# Patient Record
Sex: FEMALE | Race: WHITE
[De-identification: names, ages, dates, MRNs, and addresses within clinical notes are randomized per-mention and may not be internally consistent; named-entity substitution may affect disease eponyms.]

---

## 2018-11-14 ENCOUNTER — HOSPITAL ENCOUNTER (OUTPATIENT)
Dept: HOSPITAL 92 - BICCT | Age: 83
Discharge: HOME | End: 2018-11-14
Attending: INTERNAL MEDICINE
Payer: MEDICARE

## 2018-11-14 DIAGNOSIS — R91.8: ICD-10-CM

## 2018-11-14 DIAGNOSIS — C85.93: Primary | ICD-10-CM

## 2018-11-14 DIAGNOSIS — J44.9: ICD-10-CM

## 2018-11-14 DIAGNOSIS — I70.90: ICD-10-CM

## 2018-11-14 LAB — ESTIMATED GFR-MDRD - POC: 79

## 2018-11-14 PROCEDURE — 82565 ASSAY OF CREATININE: CPT

## 2018-11-14 PROCEDURE — 71260 CT THORAX DX C+: CPT

## 2018-11-14 PROCEDURE — 74177 CT ABD & PELVIS W/CONTRAST: CPT

## 2018-11-14 NOTE — CT
CT CHEST WITH IV CONTRAST:

CT ABDOMEN AND PELVIS WITH IV CONTRAST:

 

HISTORY:

Lymphoma.  Restaging.

 

FINDINGS:

The lungs are hyperinflated with scattered areas of parenchymal scarring.  At the anterior aspect of 
the right upper lobe, a 0.3 cm, noncalcified nodule is present.  At the superior segment of the left 
lower lobe, a 0.3 cm nodule is associated with an area of scarring.  No pleural fluid or pneumothorax
.  Nonenlarged lymph nodes are scattered about the mediastinum.  Calcification in the arterial struct
ures.  Bovine origin of the great vessels at the aortic arch.  Degenerative changes throughout the th
oracolumbar spine.  Small hiatal hernia.  Exophytic cyst projecting anteriorly from the right kidney 
measures up to 4.2 cm.  Small, nonspecific, low density nodule within the left thyroid lobe.  On the 
axial images, at the level of the renal veins, a low density area just to the left of the inferior ve
na cava appears that it could represent an aortocaval lymph node.  Coronal images show that this is a
 part of the venous system, connected to the inferior vena cava.  The urinary bladder is decompressed
.  No bowel abnormalities are evident.

 

IMPRESSION:

1.  No reliable evidence of recurrent lymphoma.

 

2.  Tiny bilateral lung nodules are as detailed above.

 

3.  Chronic obstructive pulmonary disease.

 

4.  Atherosclerosis.

 

5.  Small hiatal hernia.

 

POS: [facility]

## 2019-03-04 ENCOUNTER — HOSPITAL ENCOUNTER (EMERGENCY)
Dept: HOSPITAL 92 - SCSER | Age: 84
Discharge: HOME | End: 2019-03-04
Payer: MEDICARE

## 2019-03-04 DIAGNOSIS — F32.9: ICD-10-CM

## 2019-03-04 DIAGNOSIS — M85.88: ICD-10-CM

## 2019-03-04 DIAGNOSIS — Z79.51: ICD-10-CM

## 2019-03-04 DIAGNOSIS — Z79.899: ICD-10-CM

## 2019-03-04 DIAGNOSIS — M51.36: Primary | ICD-10-CM

## 2019-03-04 DIAGNOSIS — F41.9: ICD-10-CM

## 2019-03-04 DIAGNOSIS — I10: ICD-10-CM

## 2019-03-04 PROCEDURE — 72100 X-RAY EXAM L-S SPINE 2/3 VWS: CPT

## 2019-03-04 NOTE — RAD
THREE VIEWS OF LUMBAR SPINE:

 

DATE: 3/4/2019.

 

HISTORY: 

Right sacroiliac joint pain.  Right hip pain for 3 weeks.

 

FINDINGS: 

There are 5 non-rib-bearing lumbar-type vertebral bodies.  There is mild right convex curvature of th
e lumbar spine.  The vertebral body heights appear to be within normal limits.  The lateral view is s
lightly rotated.  Vertebral body heights do appear to be within normal limits, and no obvious fractur
e or subluxation is seen involving the lumbar spine.  There are facet degenerative changes in the low
er lumbar spine.  Vascular calcifications are seen in the abdominal aorta and involving the iliac art
eries.  There is osteopenia.

 

IMPRESSION: 

1.  Osteopenia and degenerative changes of the lumbar spine.

 

2.  No fracture or subluxation.

 

3.  Vascular calcifications.

 

POS: [facility]

## 2019-03-08 ENCOUNTER — HOSPITAL ENCOUNTER (OUTPATIENT)
Dept: HOSPITAL 92 - SCSRAD | Age: 84
Discharge: HOME | End: 2019-03-08
Attending: FAMILY MEDICINE
Payer: MEDICARE

## 2019-03-08 DIAGNOSIS — M53.3: Primary | ICD-10-CM

## 2019-03-08 PROCEDURE — 72202 X-RAY EXAM SI JOINTS 3/> VWS: CPT

## 2019-03-08 NOTE — RAD
AP AND OBLIQUE VIEWS SI JOINTS:

 

HISTORY: 

Hip pain for the past month.

 

FINDINGS: 

AP and oblique views of the SI joints demonstrate the SI joints to be unremarkable.  The sacrum is un
remarkable with no definite evidence of abnormality seen.  

 

IMPRESSION: 

Unremarkable AP and oblique views sacroiliac joints.

 

POS: [facility]

## 2019-06-10 ENCOUNTER — HOSPITAL ENCOUNTER (OUTPATIENT)
Dept: HOSPITAL 92 - SCSRAD | Age: 84
Discharge: HOME | End: 2019-06-10
Attending: FAMILY MEDICINE
Payer: MEDICARE

## 2019-06-10 DIAGNOSIS — M75.92: ICD-10-CM

## 2019-06-10 DIAGNOSIS — R07.89: Primary | ICD-10-CM

## 2019-06-10 DIAGNOSIS — I70.0: ICD-10-CM

## 2019-06-10 NOTE — RAD
Left RIBS 3 views



HISTORY: Left rib pain.



FINDINGS: Old healed injuries of the lateral aspects of left ribs 6 and 7.



No displaced rib fracture or pneumothorax are apparent. Calcification at the aortic arch. Dystrophic 
calcification at the left rotator cuff insertion.







IMPRESSION: No acute rib abnormalities are demonstrated



Atherosclerosis.



Left rotator cuff calcific tendinosis.



Reported By: PETER Murray 

Electronically Signed:  6/10/2019 2:19 PM